# Patient Record
(demographics unavailable — no encounter records)

---

## 2018-06-23 NOTE — EDM.PDOC
ED HPI GENERAL MEDICAL PROBLEM





- General


Chief Complaint: General


Stated Complaint: sore throat, fever


Time Seen by Provider: 06/23/18 11:15


Source of Information: Reports: Patient, Family (wife)





- History of Present Illness


INITIAL COMMENTS - FREE TEXT/NARRATIVE: 





43-year-old gentleman presents emergency room with increasing painful sore 

throat and difficulty swallowing. Reports she's had these symptoms for 

approximately 72 hours. He's been taking Zithromax for the last day and a half 

without improvements. He is complaining of some low-grade fevers. No nausea or 

vomiting, diarrhea. No shortness of breath or chest pain. He denies headaches 

or neck pain. He's noticed some slight fullness in his right ear but denies any 

significant pain in the ear.


Onset: Gradual


Onset Date: 06/20/18


Duration: Day(s):, Getting Worse


Location: Reports: Neck


Quality: Reports: Throbbing


Severity: Severe


Improves with: Reports: None


Associated Symptoms: Reports: Fever/Chills.  Denies: Cough, Headaches, Nausea/

Vomiting, Shortness of Breath


Treatments PTA: Reports: NSAIDS, Other Medication(s)


Other Treatments PTA: zithromax





- Related Data


 Allergies











Allergy/AdvReac Type Severity Reaction Status Date / Time


 


No Known Drug Allergies Allergy  Other Verified 06/23/18 11:26











Home Meds: 


 Home Meds





Azithromycin [Zithromax] 250 mg PO DAILY 06/23/18 [History]











Social & Family History





- Tobacco Use


Smoking Status *Q: Unknown Ever Smoked





ED ROS GENERAL





- Review of Systems


Review Of Systems: See Below


Constitutional: Reports: Fever


HEENT: Reports: Throat Pain, Throat Swelling.  Denies: Hearing Loss, Rhinitis, 

Sinus Problem, Vertigo


Respiratory: Denies: Shortness of Breath, Cough


Cardiovascular: Denies: Chest Pain, Dyspnea on Exertion


Endocrine: Reports: No Symptoms


GI/Abdominal: Reports: No Symptoms


: Reports: No Symptoms


Musculoskeletal: Reports: No Symptoms


Skin: Reports: No Symptoms


Neurological: Reports: No Symptoms


Psychiatric: Reports: No Symptoms


Hematologic/Lymphatic: Reports: No Symptoms


Immunologic: Reports: No Symptoms





ED EXAM, GENERAL





- Physical Exam


Exam: See Below


Exam Limited By: No Limitations


General Appearance: Alert, WD/WN, No Apparent Distress


Ears: Other (Moderate cerumen impaction left ear. Right ear TM is dull with no 

redness)


Ear Exam: Right Ear: TM Dull


Nose: Normal Inspection


Throat/Mouth: Normal Inspection, Normal Lips, Normal Teeth, Normal Voice, No 

Airway Compromise, Inflammation, Other (Tonsillar enlargement in redness but no 

exudate appreciated. No lymphadenopathy)


Head: Atraumatic


Neck: Normal Inspection.  No: Lymphadenopathy (L), Lymphadenopathy (R)


Respiratory/Chest: No Respiratory Distress, Lungs Clear


Cardiovascular: Normal Peripheral Pulses, Regular Rate, Rhythm, No Murmur


Extremities: Normal Inspection


Neurological: Alert, Oriented, Normal Cognition, Normal Gait, No Motor/Sensory 

Deficits


Psychiatric: Normal Affect, Normal Mood


Skin Exam: Warm, Dry, Intact, Normal Color





Course





- Vital Signs


Last Recorded V/S: 





 Last Vital Signs











Temp  98.2 F   06/23/18 11:35


 


Pulse  85   06/23/18 11:35


 


Resp  18   06/23/18 11:35


 


BP  154/90 H  06/23/18 11:35


 


Pulse Ox  96   06/23/18 11:35














- Orders/Labs/Meds


Orders: 





 Active Orders 24 hr











 Category Date Time Status


 


 CULTURE STREP A CONFIRMATION [RM] Stat Lab  06/23/18 11:48 Results


 


 STREP SCRN A RAPID W CULT CONF [RM] Stat Lab  06/23/18 11:48 Results











Meds: 





Medications














Discontinued Medications














Generic Name Dose Route Start Last Admin





  Trade Name Shirley  PRN Reason Stop Dose Admin


 


Ceftriaxone Sodium  2 gm  06/23/18 12:01  06/23/18 12:09





  Rocephin  IM  06/23/18 12:02  2 gm





  ONETIME ONE   Administration





     





     





     





     














Departure





- Departure


Time of Disposition: 12:15


Disposition: Home, Self-Care 01


Condition: Good


Clinical Impression: 


Acute tonsillitis


Qualifiers:


 Pharyngitis/tonsillitis etiology: unspecified etiology Qualified Code(s): 

J03.90 - Acute tonsillitis, unspecified








- Discharge Information


Referrals: 


Sharmin Sullivan PA-C [Primary Care Provider] - 


Forms:  ED Department Discharge


Additional Instructions: 


May use warm salt water gargles or hot tea with lemon or honey.


Follow up on Monday with provider if not better or sooner in ED if needed.





- My Orders


Last 24 Hours: 





My Active Orders





06/23/18 11:48


CULTURE STREP A CONFIRMATION [RM] Stat 


STREP SCRN A RAPID W CULT CONF [RM] Stat 














- Assessment/Plan


Last 24 Hours: 





My Active Orders





06/23/18 11:48


CULTURE STREP A CONFIRMATION [RM] Stat 


STREP SCRN A RAPID W CULT CONF [RM] Stat 











Assessment:: 





Acute tonsillitis


Plan: 





Rocephin 2 g IM


Lozenges for sore throat


Ibuprofen 800 mg 3 times a day for inflammation


Tylenol 650 mg 4 times a day for fevers


Follow-up and 48-72 hours if symptoms do not seem to be improving